# Patient Record
Sex: MALE | Race: BLACK OR AFRICAN AMERICAN | NOT HISPANIC OR LATINO
[De-identification: names, ages, dates, MRNs, and addresses within clinical notes are randomized per-mention and may not be internally consistent; named-entity substitution may affect disease eponyms.]

---

## 2019-07-24 ENCOUNTER — APPOINTMENT (OUTPATIENT)
Dept: FAMILY MEDICINE | Facility: CLINIC | Age: 54
End: 2019-07-24
Payer: MEDICAID

## 2019-07-24 VITALS
OXYGEN SATURATION: 98 % | WEIGHT: 212 LBS | RESPIRATION RATE: 14 BRPM | SYSTOLIC BLOOD PRESSURE: 128 MMHG | HEART RATE: 68 BPM | TEMPERATURE: 98.2 F | DIASTOLIC BLOOD PRESSURE: 76 MMHG | BODY MASS INDEX: 30.35 KG/M2 | HEIGHT: 70 IN

## 2019-07-24 DIAGNOSIS — Z87.828 PERSONAL HISTORY OF OTHER (HEALED) PHYSICAL INJURY AND TRAUMA: ICD-10-CM

## 2019-07-24 DIAGNOSIS — Z86.79 PERSONAL HISTORY OF OTHER DISEASES OF THE CIRCULATORY SYSTEM: ICD-10-CM

## 2019-07-24 DIAGNOSIS — F19.90 OTHER PSYCHOACTIVE SUBSTANCE USE, UNSPECIFIED, UNCOMPLICATED: ICD-10-CM

## 2019-07-24 DIAGNOSIS — Z00.00 ENCOUNTER FOR GENERAL ADULT MEDICAL EXAMINATION W/OUT ABNORMAL FINDINGS: ICD-10-CM

## 2019-07-24 DIAGNOSIS — F17.210 NICOTINE DEPENDENCE, CIGARETTES, UNCOMPLICATED: ICD-10-CM

## 2019-07-24 DIAGNOSIS — M54.5 LOW BACK PAIN: ICD-10-CM

## 2019-07-24 DIAGNOSIS — Z56.0 UNEMPLOYMENT, UNSPECIFIED: ICD-10-CM

## 2019-07-24 DIAGNOSIS — I10 ESSENTIAL (PRIMARY) HYPERTENSION: ICD-10-CM

## 2019-07-24 DIAGNOSIS — Z72.0 TOBACCO USE: ICD-10-CM

## 2019-07-24 DIAGNOSIS — Z82.49 FAMILY HISTORY OF ISCHEMIC HEART DISEASE AND OTHER DISEASES OF THE CIRCULATORY SYSTEM: ICD-10-CM

## 2019-07-24 DIAGNOSIS — M25.562 PAIN IN RIGHT KNEE: ICD-10-CM

## 2019-07-24 DIAGNOSIS — M25.561 PAIN IN RIGHT KNEE: ICD-10-CM

## 2019-07-24 DIAGNOSIS — Z78.9 OTHER SPECIFIED HEALTH STATUS: ICD-10-CM

## 2019-07-24 DIAGNOSIS — Z59.0 HOMELESSNESS: ICD-10-CM

## 2019-07-24 DIAGNOSIS — Z80.1 FAMILY HISTORY OF MALIGNANT NEOPLASM OF TRACHEA, BRONCHUS AND LUNG: ICD-10-CM

## 2019-07-24 DIAGNOSIS — E78.5 HYPERLIPIDEMIA, UNSPECIFIED: ICD-10-CM

## 2019-07-24 PROCEDURE — 99406 BEHAV CHNG SMOKING 3-10 MIN: CPT

## 2019-07-24 PROCEDURE — 99204 OFFICE O/P NEW MOD 45 MIN: CPT | Mod: 25

## 2019-07-24 RX ORDER — ASPIRIN 81 MG/1
81 TABLET ORAL
Refills: 0 | Status: ACTIVE | COMMUNITY

## 2019-07-24 RX ORDER — HYDROCHLOROTHIAZIDE 12.5 MG/1
12.5 TABLET ORAL
Refills: 0 | Status: ACTIVE | COMMUNITY

## 2019-07-24 RX ORDER — AMLODIPINE BESYLATE 5 MG/1
5 TABLET ORAL
Refills: 0 | Status: ACTIVE | COMMUNITY

## 2019-07-24 RX ORDER — METOPROLOL TARTRATE 50 MG/1
50 TABLET, FILM COATED ORAL
Refills: 0 | Status: ACTIVE | COMMUNITY

## 2019-07-24 RX ORDER — HYDROCHLOROTHIAZIDE 25 MG/1
25 TABLET ORAL
Refills: 0 | Status: ACTIVE | COMMUNITY

## 2019-07-24 RX ORDER — ATORVASTATIN CALCIUM 80 MG/1
80 TABLET, FILM COATED ORAL
Refills: 0 | Status: ACTIVE | COMMUNITY

## 2019-07-24 RX ORDER — POTASSIUM CHLORIDE 10 MEQ
10 CAPSULE, EXTENDED RELEASE ORAL
Refills: 0 | Status: ACTIVE | COMMUNITY

## 2019-07-24 SDOH — ECONOMIC STABILITY - HOUSING INSECURITY: HOMELESSNESS: Z59.0

## 2019-07-24 SDOH — ECONOMIC STABILITY - INCOME SECURITY: UNEMPLOYMENT, UNSPECIFIED: Z56.0

## 2019-07-24 NOTE — HEALTH RISK ASSESSMENT
[Good] : ~his/her~  mood as  good [] : Yes [Yes] : Yes [Monthly or less (1 pt)] : Monthly or less (1 point) [1 or 2 (0 pts)] : 1 or 2 (0 points) [Never (0 pts)] : Never (0 points) [0] : 2) Feeling down, depressed, or hopeless: Not at all (0) [Patient reported colonoscopy was normal] : Patient reported colonoscopy was normal [Housing] : housing [Transportation] : transportation [Financial] : financial [Homeless] : homeless [Unemployed] : unemployed [College] : College [] :  [Sexually Active] : sexually active [Feels Safe at Home] : Feels safe at home [Fully functional (bathing, dressing, toileting, transferring, walking, feeding)] : Fully functional (bathing, dressing, toileting, transferring, walking, feeding) [Fully functional (using the telephone, shopping, preparing meals, housekeeping, doing laundry, using] : Fully functional and needs no help or supervision to perform IADLs (using the telephone, shopping, preparing meals, housekeeping, doing laundry, using transportation, managing medications and managing finances) [Reports changes in vision] : Reports changes in vision [Reports changes in dental health] : Reports changes in dental health [Smoke Detector] : smoke detector [Carbon Monoxide Detector] : carbon monoxide detector [Safety elements used in home] : safety elements used in home [Seat Belt] :  uses seat belt [Sunscreen] : uses sunscreen [No falls in past year] : Patient reported no falls in the past year [de-identified] : walking 3-4 times/week [de-identified] : smokes half pack/day [de-identified] : trying to watch diet [Change in mental status noted] : No change in mental status noted [Language] : denies difficulty with language [Behavior] : denies difficulty with behavior [Learning/Retaining New Information] : denies difficulty learning/retaining new information [Spatial Ability and Orientation] : denies difficulty with spatial ability and orientation [Reasoning] : denies difficulty with reasoning [Handling Complex Tasks] : denies difficulty handling complex tasks [Guns at Home] : no guns at home [High Risk Behavior] : no high risk behavior [TB Exposure] : is not being exposed to tuberculosis [Travel to Developing Areas] : does not  travel to developing areas [ColonoscopyComments] : 2015 [Caregiver Concerns] : does not have caregiver concerns [HIVDate] : 2018 [HepatitisCDate] : 2018 [HIVComments] : negative [FreeTextEntry2] :  [de-identified] : presently living in a shelter: [facility] [HepatitisCComments] : negative [de-identified] : Associate in computer science [FreeTextEntry3] : 2 sons (Adults) [de-identified] : wears glasses [de-identified] : pain in frontal upper jaw [AdvancecareDate] : 7/2019

## 2019-07-24 NOTE — COUNSELING
[Weight management counseling provided] : Weight management [Healthy eating counseling provided] : healthy eating [Activity counseling provided] : activity [Discussed Risks and Advised to Quit Smoking] : Discussed risks and advised to quit smoking [Behavioral health counseling provided] : behavioral health  [Low Fat Diet] : Low fat diet [Weigh Self Once Weekly] : Weigh self once weekly [Decrease Portions] : Decrease food portions [Low Salt Diet] : Low salt diet [Keep Food Diary] : Keep food diary [___ min/wk activity recommended] : [unfilled] min/wk activity recommended [Engage in a relaxing activity] : Engage in a relaxing activity [Sleep ___ hours/day] : Sleep [unfilled] hours/day [Walking] : Walking [Plan in advance] : Plan in advance [Transportation Issues] : Transportation issues [Financial issues] : Financial issues [Tobacco Use Cessation Intermediate Greater Than 3 Minutes Up to 10 Minutes] : Tobacco Use Cessation Intermediate Greater Than 3 Minutes Up to 10 Minutes

## 2019-07-24 NOTE — PLAN
[FreeTextEntry1] : Health management:  PE done, discussed life style modification, healthy eating ( less salts, fats, sweets, more water, fruits, vegetables).  Walking 30-60 minutes/day.  sleeping 8-10hrs/night, \par dental check every 6 months,  eye check annually\par HTN:  continue with HCTz 25mg + 12.5mg daily\par           Amlodipine 5mg daily,  Metropolol tartrate 50mg twice/day,  Aspirin 81mg daily.\par Dyslipidemia:  Atorvastatin 80mg daily, life style modification discussed\par Tobaco used/ substance used:  smoking cessation/substance use cessation discussed.  will start [facility] programs 7/19.\par Labs: Cbc, cmp, lipids, tsh, hgbA1c  Requisition given\par F/u in 10-12 wks or prn\par \par

## 2019-07-24 NOTE — HISTORY OF PRESENT ILLNESS
[FreeTextEntry1] : Physical exam [de-identified] : 53 y.o male with PMHx of HTN, HLD, Tobacco use, alcohol use, substance use, severe lower back pain torn meniscus.  Here today for PE to be linked to [facility] programs.   Reported having severe lower back pain/ bilat knee pain,  left knee worse.  following up with Orthopedics and pain management, was told might need surgery.  Reported using patches for pain with little to moderate relief.

## 2019-07-24 NOTE — REVIEW OF SYSTEMS
[Vision Problems] : vision problems [Joint Pain] : joint pain [Back Pain] : back pain [Fever] : no fever [Chills] : no chills [Fatigue] : no fatigue [Hot Flashes] : no hot flashes [Night Sweats] : no night sweats [Pain] : no pain [Discharge] : no discharge [Recent Change In Weight] : ~T no recent weight change [Dryness] : no dryness [Redness] : no redness [Itching] : no itching [Hearing Loss] : no hearing loss [Earache] : no earache [Nasal Discharge] : no nasal discharge [Sore Throat] : no sore throat [Palpitations] : no palpitations [Chest Pain] : no chest pain [Claudication] : no  leg claudication [Lower Ext Edema] : no lower extremity edema [Orthopena] : no orthopnea [Paroysmal Nocturnal Dyspnea] : no paroysmal nocturnal dyspnea [Shortness Of Breath] : no shortness of breath [Wheezing] : no wheezing [Cough] : no cough [Dyspnea on Exertion] : not dyspnea on exertion [Abdominal Pain] : no abdominal pain [Constipation] : no constipation [Nausea] : no nausea [Diarrhea] : no diarrhea [Heartburn] : no heartburn [Vomiting] : no vomiting [Melena] : no melena [Dysuria] : no dysuria [Incontinence] : no incontinence [Nocturia] : no nocturia [Hesitancy] : no hesitancy [Hematuria] : no hematuria [Impotence] : no impotency [Frequency] : no frequency [Joint Stiffness] : joint stiffness [Poor Libido] : libido not poor [Muscle Pain] : no muscle pain [Muscle Weakness] : no muscle weakness [Mole Changes] : no mole changes [Joint Swelling] : no joint swelling [Hair Changes] : no hair changes [Nail Changes] : no nail changes [Skin Rash] : no skin rash [Headache] : no headache [Confusion] : no confusion [Fainting] : no fainting [Dizziness] : no dizziness [Unsteady Walk] : no ataxia [Suicidal] : not suicidal [Memory Loss] : no memory loss [Anxiety] : no anxiety [Insomnia] : no insomnia [Depression] : no depression [Easy Bruising] : no easy bruising [Easy Bleeding] : no easy bleeding [Swollen Glands] : no swollen glands [FreeTextEntry3] : wears glasses [FreeTextEntry9] : lower back pain, bilat knees pain, left shoulder, left hand

## 2019-07-24 NOTE — PHYSICAL EXAM
[Normal] : affect was normal and insight and judgment were intact [No Joint Swelling] : no joint swelling [de-identified] : wears glasses [de-identified] : having difficulties to lift legs/ tendernes/pain with movement  [de-identified] : Tenderness around lumbar area